# Patient Record
Sex: FEMALE | Race: WHITE | NOT HISPANIC OR LATINO | URBAN - METROPOLITAN AREA
[De-identification: names, ages, dates, MRNs, and addresses within clinical notes are randomized per-mention and may not be internally consistent; named-entity substitution may affect disease eponyms.]

---

## 2018-11-07 ENCOUNTER — OUTPATIENT (OUTPATIENT)
Dept: OUTPATIENT SERVICES | Facility: HOSPITAL | Age: 53
LOS: 1 days | End: 2018-11-07
Payer: COMMERCIAL

## 2018-11-07 DIAGNOSIS — Z22.321 CARRIER OR SUSPECTED CARRIER OF METHICILLIN SUSCEPTIBLE STAPHYLOCOCCUS AUREUS: ICD-10-CM

## 2018-11-07 LAB
MRSA PCR RESULT.: NEGATIVE — SIGNIFICANT CHANGE UP
S AUREUS DNA NOSE QL NAA+PROBE: NEGATIVE — SIGNIFICANT CHANGE UP

## 2018-11-07 PROCEDURE — 87641 MR-STAPH DNA AMP PROBE: CPT

## 2018-11-07 PROCEDURE — 77073 BONE LENGTH STUDIES: CPT

## 2018-11-07 PROCEDURE — 77073 BONE LENGTH STUDIES: CPT | Mod: 26

## 2018-11-27 PROBLEM — Z00.00 ENCOUNTER FOR PREVENTIVE HEALTH EXAMINATION: Status: ACTIVE | Noted: 2018-11-27

## 2019-01-11 ENCOUNTER — APPOINTMENT (OUTPATIENT)
Dept: ORTHOPEDIC SURGERY | Facility: CLINIC | Age: 54
End: 2019-01-11
Payer: COMMERCIAL

## 2019-01-11 VITALS — HEIGHT: 68 IN | BODY MASS INDEX: 26.98 KG/M2 | OXYGEN SATURATION: 98 % | HEART RATE: 95 BPM | WEIGHT: 178 LBS

## 2019-01-11 DIAGNOSIS — M17.0 BILATERAL PRIMARY OSTEOARTHRITIS OF KNEE: ICD-10-CM

## 2019-01-11 DIAGNOSIS — Z87.39 PERSONAL HISTORY OF OTHER DISEASES OF THE MUSCULOSKELETAL SYSTEM AND CONNECTIVE TISSUE: ICD-10-CM

## 2019-01-11 PROCEDURE — 99204 OFFICE O/P NEW MOD 45 MIN: CPT

## 2019-01-18 PROBLEM — Z87.39 HISTORY OF OSTEOPOROSIS: Status: RESOLVED | Noted: 2019-01-11 | Resolved: 2019-01-18

## 2019-01-18 RX ORDER — MULTIVITAMIN
TABLET ORAL
Refills: 0 | Status: ACTIVE | COMMUNITY

## 2019-01-18 NOTE — ADDENDUM
[FreeTextEntry1] : This note was written by Gaviota Garza on 01/11/2019 acting as scribe for Dr. Ferrari and REJI Montero.\par

## 2019-01-18 NOTE — PHYSICAL EXAM
[de-identified] : Constitutional: Well appearing. No acute distress.\par Mental Status: Alert & oriented to person, place and time. Normal affect.\par Pulmonary: No respiratory distress. Normal chest excursion.\par \par Gait: Normal.\par Ambulatory assist devices: None.\par \par Cervical spine: Skin intact. No visible deformity. Painless active ROM without evident restriction.\par Bilateral upper extremities: Skin intact. No deformity. Painless active ROM without evident restriction.\par Thoracolumbar spine: No deformity. No tenderness. No radicular pain on passive straight leg raise bilaterally.\par Pelvis: No pelvic obliquity. No tenderness.\par Leg lengths: Equal.\par Bilateral Hips: No swelling or deformity. No focal tenderness. Painless and unrestricted range of motion. No crepitation. \par \par Right Knee:\par Skin intact.\par No surgical scars.\par No erythema or ecchymosis.\par No swelling or effusion.\par No deformity.\par No focal tenderness.\par Painful and unrestricted range of motion. \par Central patellar tracking.\par No crepitation.\par No instability.\par \par Left Knee:\par Skin intact.\par No surgical scars.\par No erythema or ecchymosis.\par No swelling or effusion.\par No deformity.\par No focal tenderness\par Painful and restricted range of motion. ROM from 2-3 degrees to 120-125 degrees of flexion. Pain on terminal flexion and ROM.\par Central patellar tracking.\par No crepitation.\par No instability.\par \par Neurological: Intact distal crude touch sensation. Normal distal motor power. \par Cardiovascular: Palpable dorsalis pedis and posterior tibialis pulses. Brisk capillary refill. No peripheral edema.\par Lymphatics: No peripheral adenopathy appreciated. [de-identified] : External X-ray imaging done at [facility] show moderate arthritis of both knees, right > left.

## 2019-01-18 NOTE — DISCUSSION/SUMMARY
[de-identified] : 54 y/o F presents for initial evaluation of bilateral knee pain. External X-ray imaging shows moderate arthritis of both knees, right > left. \par \par I counselled that joint replacement surgery is an elective procedure and the final decision to proceed depends on the patient's sense of need, along with the severity of damage evident on physical exam and imaging. The patient has enough damage that proceeding with surgery is reasonable and appropriate at this time.\par \par We discussed the risks and benefits of proceeding with surgery vs. further delay. I discussed that a patient's presurgical medical and physical fitness affects postoperative outcome and satisfaction. We discussed that delaying surgery 1-2 years does not decrease lifetime revision risk meaningfully, whereas allowing disability to progress can result in a more difficult recovery and a less ideal final outcome. If the patient elects to delay surgery at this time I recommend a low impact exercise regimen to keep the patient as lean, limber, strong and coordinated as possible while they live with arthritis.\par \par I advised her to find a level of low impact exercise that helps her to maintain flexibility and strength without aggravating the knee. I wrote her a new prescription for physical therapy.\par \par The patient and I discussed her participation in the [employer] research study and I provided her with a packet of information for further consideration. Follow up PRN or to discuss participation in the research study.

## 2019-01-18 NOTE — HISTORY OF PRESENT ILLNESS
[Worsening] : worsening [___ yrs] : [unfilled] year(s) ago [Constant] : ~He/She~ states the symptoms seem to be constant [Bending] : worsened by bending [Sitting] : worsened by sitting [Walking] : worsened by walking [None] : No relieving factors are noted [de-identified] : 52 y/o F presents for initial evaluation of bilateral knee pain, pain in left > right. The intermittent pain began about 4 years ago and is exacerbated by walking. She is able to walk greater than 10 blocks, sometimes with a slight limp, and can use stairs normally with a railing. Patient has tried acetaminophen and Ibuprofen with mild pain relief and Naproxen with moderate pain relief. She has tried physical therapy with no relief and 3 gel injections with short-lived relief. She has not had any steroid injections. Patient plans to go skiing in about 3 weeks.  [Acetaminophen] : not relieved by acetaminophen [NSAIDs] : not relieved by nonsteroidal anti-inflammatory drugs [Physical Therapy] : not relieved by physical therapy

## 2021-04-09 ENCOUNTER — TRANSCRIPTION ENCOUNTER (OUTPATIENT)
Age: 56
End: 2021-04-09

## 2021-08-20 ENCOUNTER — TRANSCRIPTION ENCOUNTER (OUTPATIENT)
Age: 56
End: 2021-08-20

## 2021-09-10 ENCOUNTER — TRANSCRIPTION ENCOUNTER (OUTPATIENT)
Age: 56
End: 2021-09-10

## 2021-09-12 ENCOUNTER — TRANSCRIPTION ENCOUNTER (OUTPATIENT)
Age: 56
End: 2021-09-12

## 2024-01-23 ENCOUNTER — NON-APPOINTMENT (OUTPATIENT)
Age: 59
End: 2024-01-23

## 2024-01-23 ENCOUNTER — APPOINTMENT (OUTPATIENT)
Dept: HEART AND VASCULAR | Facility: CLINIC | Age: 59
End: 2024-01-23
Payer: COMMERCIAL

## 2024-01-23 VITALS
WEIGHT: 223 LBS | OXYGEN SATURATION: 99 % | HEIGHT: 68 IN | HEART RATE: 93 BPM | BODY MASS INDEX: 33.8 KG/M2 | TEMPERATURE: 98 F | SYSTOLIC BLOOD PRESSURE: 128 MMHG | DIASTOLIC BLOOD PRESSURE: 84 MMHG

## 2024-01-23 VITALS — SYSTOLIC BLOOD PRESSURE: 140 MMHG | DIASTOLIC BLOOD PRESSURE: 95 MMHG

## 2024-01-23 DIAGNOSIS — I10 ESSENTIAL (PRIMARY) HYPERTENSION: ICD-10-CM

## 2024-01-23 PROCEDURE — 93000 ELECTROCARDIOGRAM COMPLETE: CPT

## 2024-01-23 PROCEDURE — 99203 OFFICE O/P NEW LOW 30 MIN: CPT | Mod: 25

## 2024-01-23 RX ORDER — AMLODIPINE BESYLATE 2.5 MG/1
2.5 TABLET ORAL DAILY
Qty: 90 | Refills: 3 | Status: ACTIVE | COMMUNITY
Start: 2024-01-23

## 2024-01-23 RX ORDER — NAPROXEN 500 MG/1
500 TABLET ORAL
Qty: 60 | Refills: 2 | Status: DISCONTINUED | COMMUNITY
Start: 2019-01-11 | End: 2024-01-23

## 2024-01-23 NOTE — REASON FOR VISIT
[FreeTextEntry1] : 57 y/o F with HTN and recent use of Naprosyn  Now on Amlodipine 2.5 .  Has been on BP meds on and off for years.  Cholesterol and sugar were both elevated as well.  I have asked pt to get me a copy..

## 2024-01-23 NOTE — ASSESSMENT
[FreeTextEntry1] : HTN- BP labile in the office, no change in medication. Echo ordered to assess for LVH and LAE.  Told to avoid NSAIDs, decongestants. Diet and exercise reviewed.

## 2024-02-06 ENCOUNTER — APPOINTMENT (OUTPATIENT)
Dept: HEART AND VASCULAR | Facility: CLINIC | Age: 59
End: 2024-02-06
Payer: COMMERCIAL

## 2024-02-06 PROCEDURE — 93306 TTE W/DOPPLER COMPLETE: CPT

## 2024-02-10 ENCOUNTER — TRANSCRIPTION ENCOUNTER (OUTPATIENT)
Age: 59
End: 2024-02-10

## 2024-05-03 NOTE — HISTORY OF PRESENT ILLNESS
[FreeTextEntry1] : PCP:  Cardiologist: Dr. Frank Castle.   57 y/o F with PMHx. of HTN, OA, Osteoporosis, PSH of C-sections x4 (1993, 1994, 2004, 2007), Cholecystectomy, tonsillectomy referred by Dr. Charmaine Fuller for large broad based polypoid mass found at 26-30cm with friability and near obstructive found during colonoscopy on 4/17/24. Piece meal hot snare done. Pathology showed Tubulovillous adenoma with high grade dysplasia.

## 2024-05-06 ENCOUNTER — NON-APPOINTMENT (OUTPATIENT)
Age: 59
End: 2024-05-06

## 2024-05-06 ENCOUNTER — APPOINTMENT (OUTPATIENT)
Dept: COLORECTAL SURGERY | Facility: CLINIC | Age: 59
End: 2024-05-06
Payer: COMMERCIAL

## 2024-05-06 VITALS
OXYGEN SATURATION: 96 % | HEIGHT: 68.5 IN | HEART RATE: 96 BPM | DIASTOLIC BLOOD PRESSURE: 100 MMHG | TEMPERATURE: 97.9 F | WEIGHT: 225 LBS | RESPIRATION RATE: 16 BRPM | BODY MASS INDEX: 33.71 KG/M2 | SYSTOLIC BLOOD PRESSURE: 170 MMHG

## 2024-05-06 DIAGNOSIS — K63.5 POLYP OF COLON: ICD-10-CM

## 2024-05-06 DIAGNOSIS — Z01.818 ENCOUNTER FOR OTHER PREPROCEDURAL EXAMINATION: ICD-10-CM

## 2024-05-06 PROCEDURE — 99203 OFFICE O/P NEW LOW 30 MIN: CPT

## 2024-05-22 ENCOUNTER — APPOINTMENT (OUTPATIENT)
Dept: COLORECTAL SURGERY | Facility: AMBULATORY SURGERY CENTER | Age: 59
End: 2024-05-22
Payer: COMMERCIAL

## 2024-05-22 ENCOUNTER — RESULT REVIEW (OUTPATIENT)
Age: 59
End: 2024-05-22

## 2024-05-22 PROCEDURE — 45331 SIGMOIDOSCOPY AND BIOPSY: CPT

## 2024-06-10 ENCOUNTER — APPOINTMENT (OUTPATIENT)
Dept: COLORECTAL SURGERY | Facility: CLINIC | Age: 59
End: 2024-06-10
Payer: COMMERCIAL

## 2024-06-10 VITALS
DIASTOLIC BLOOD PRESSURE: 93 MMHG | OXYGEN SATURATION: 96 % | HEIGHT: 68.5 IN | SYSTOLIC BLOOD PRESSURE: 144 MMHG | HEART RATE: 80 BPM | WEIGHT: 224 LBS | BODY MASS INDEX: 33.56 KG/M2 | RESPIRATION RATE: 16 BRPM | TEMPERATURE: 98.1 F

## 2024-06-10 PROCEDURE — 99212 OFFICE O/P EST SF 10 MIN: CPT

## 2024-06-10 NOTE — ASSESSMENT
[FreeTextEntry1] : ADenoma was fully removed by Dr. Fuller. Flex sig showed hyperplastic tissue on the stalk - no adneoma. Visually, looked fine - like stalk alone Will observe x 6 months and then repeat flex sig  at [facility] time, if still present, it can be snared.   Explained to patient. Dr. Fuller aware. Patient to return to Dr. Fuller after next flex sig.   All questions answered.

## 2024-06-10 NOTE — HISTORY OF PRESENT ILLNESS
[FreeTextEntry1] : PCP: Kathy Ingram.  Cardiologist: Dr. Frank Castle.   58 y/o F with PMHx. of HTN, OA, Osteoporosis, PSH of C-sections x4 (1993, 1994, 2004, 2007), Cholecystectomy, tonsillectomy referred by Dr. Charmaine Fuller for large broad based polypoid mass found at 26-30cm with friability and near obstructive found during colonoscopy on 4/17/24. Piece meal hot snare done. Pathology showed Tubulovillous adenoma with high grade dysplasia. As per patient, it was not fully removed.   Bowel movements are daily. Stools are formed. Denies rectal bleeding. Denies pain with BM. Denies unintentional weight loss, or change in bowel habits.  She is here to review the flexible sigmoidoscopy report and talk about next step.

## 2024-06-10 NOTE — PHYSICAL EXAM
[Abdomen Masses] : No abdominal masses [Abdomen Tenderness] : ~T No ~M abdominal tenderness [Exam Deferred] : exam was deferred [de-identified] : benign

## 2025-01-24 ENCOUNTER — APPOINTMENT (OUTPATIENT)
Dept: COLORECTAL SURGERY | Facility: CLINIC | Age: 60
End: 2025-01-24